# Patient Record
Sex: FEMALE | Race: WHITE | Employment: FULL TIME | URBAN - METROPOLITAN AREA
[De-identification: names, ages, dates, MRNs, and addresses within clinical notes are randomized per-mention and may not be internally consistent; named-entity substitution may affect disease eponyms.]

---

## 2024-10-16 ENCOUNTER — APPOINTMENT (OUTPATIENT)
Dept: PRIMARY CARE | Facility: CLINIC | Age: 64
End: 2024-10-16
Payer: COMMERCIAL

## 2025-01-29 ENCOUNTER — OFFICE VISIT (OUTPATIENT)
Dept: URGENT CARE | Age: 65
End: 2025-01-29
Payer: COMMERCIAL

## 2025-01-29 VITALS
HEART RATE: 92 BPM | DIASTOLIC BLOOD PRESSURE: 79 MMHG | SYSTOLIC BLOOD PRESSURE: 142 MMHG | OXYGEN SATURATION: 99 % | TEMPERATURE: 98.1 F

## 2025-01-29 DIAGNOSIS — J02.9 SORE THROAT: ICD-10-CM

## 2025-01-29 DIAGNOSIS — U07.1 COVID-19: Primary | ICD-10-CM

## 2025-01-29 LAB
POC BINAX EXPIRATION: 0
POC BINAX NOW COVID SERIAL NUMBER: 0
POC RAPID INFLUENZA A: NEGATIVE
POC RAPID INFLUENZA B: NEGATIVE
POC RAPID STREP: NEGATIVE
POC SARS-COV-2 AG BINAX: ABNORMAL

## 2025-01-29 RX ORDER — METFORMIN HYDROCHLORIDE 500 MG/1
2 TABLET, EXTENDED RELEASE ORAL
COMMUNITY
Start: 2025-01-10 | End: 2025-07-09

## 2025-01-29 ASSESSMENT — ENCOUNTER SYMPTOMS
ACTIVITY CHANGE: 1
FATIGUE: 1
CHILLS: 1
SORE THROAT: 1
COUGH: 1

## 2025-01-29 NOTE — PROGRESS NOTES
Subjective   Patient ID: Hayley Talamantes is a 64 y.o. female. They present today with a chief complaint of Sore Throat (Sore throat, chills, fatigue, laryngitis x 2 days).    History of Present Illness  Patient is a pleasant 64-year-old female with no reported past medical history presents urgent care today with complaint of flulike symptoms.  She states her symptoms started approximately 2 days ago.  Specifically she endorses sore throat, chills, fatigue and laryngitis.  She is been using over-the-counter medication with some relief.  She denies any chest pain or shortness of breath.  No other complaints or concerns mention at this time.      History provided by:  Patient  Sore Throat   Associated symptoms include congestion and coughing.       Past Medical History  Allergies as of 01/29/2025    (No Known Allergies)       (Not in a hospital admission)         Past Medical History:   Diagnosis Date    Personal history of urinary (tract) infections 01/12/2016    History of urinary tract infection       Past Surgical History:   Procedure Laterality Date    TONSILLECTOMY  12/10/2015    Tonsillectomy            Review of Systems  Review of Systems   Constitutional:  Positive for activity change, chills and fatigue.   HENT:  Positive for congestion and sore throat.    Respiratory:  Positive for cough.                                   Objective    Vitals:    01/29/25 1551   BP: 142/79   BP Location: Left arm   Patient Position: Sitting   BP Cuff Size: Large adult   Pulse: 92   Temp: 36.7 °C (98.1 °F)   TempSrc: Oral   SpO2: 99%     No LMP recorded.    Physical Exam  Vitals and nursing note reviewed.   Constitutional:       General: She is not in acute distress.     Appearance: Normal appearance. She is not ill-appearing, toxic-appearing or diaphoretic.   HENT:      Head: Normocephalic and atraumatic.      Right Ear: Tympanic membrane, ear canal and external ear normal.      Left Ear: Tympanic membrane, ear canal and  external ear normal.      Nose: Congestion present.      Mouth/Throat:      Mouth: Mucous membranes are moist.      Pharynx: Posterior oropharyngeal erythema present. No oropharyngeal exudate.   Eyes:      Extraocular Movements: Extraocular movements intact.      Conjunctiva/sclera: Conjunctivae normal.      Pupils: Pupils are equal, round, and reactive to light.   Cardiovascular:      Rate and Rhythm: Normal rate and regular rhythm.      Pulses: Normal pulses.      Heart sounds: Normal heart sounds.   Pulmonary:      Effort: Pulmonary effort is normal. No respiratory distress.      Breath sounds: Normal breath sounds. No stridor. No wheezing, rhonchi or rales.   Chest:      Chest wall: No tenderness.   Musculoskeletal:         General: Normal range of motion.      Cervical back: Normal range of motion and neck supple.   Skin:     General: Skin is warm and dry.      Capillary Refill: Capillary refill takes less than 2 seconds.   Neurological:      General: No focal deficit present.      Mental Status: She is alert and oriented to person, place, and time.   Psychiatric:         Mood and Affect: Mood normal.         Behavior: Behavior normal.         Procedures      Assessment/Plan   Allergies, medications, history, and pertinent labs/EKGs/Imaging reviewed by HORACE Beltre.     Medical Decision Making    Patient is well appearing, afebrile, non toxic, not hypoxic, and appropriate for outpatient treatment and management at time of evaluation. Patient presents with flulike symptoms.     Differential includes but not limited to: COVID, streptococcal pharyngitis, influenza, URI, pneumonia, other    Physical exam as described above.  Vitals within normal limits.  Rapid COVID is positive.  Rapid strep is negative.  Rapid flu is negative.      I discussed these findings with the patient.  Recommended continued use of over-the-counter medication as needed for symptom relief and close follow-up with PCP.  Red flags  and ER precautions discussed.  Patient voices understanding and is agreeable this plan.  Patient was discharged in stable condition.  All questions and concerns addressed.    Orders and Diagnoses  Diagnoses and all orders for this visit:  COVID-19  Sore throat  -     POCT Covid-19 Rapid Antigen  -     POCT Influenza A/B manually resulted  -     POCT rapid strep A manually resulted      Medical Admin Record      Follow Up Instructions  No follow-ups on file.    Patient disposition: Home    Electronically signed by HORACE Beltre  4:01 PM